# Patient Record
Sex: MALE | Race: WHITE | NOT HISPANIC OR LATINO | Employment: UNEMPLOYED | URBAN - METROPOLITAN AREA
[De-identification: names, ages, dates, MRNs, and addresses within clinical notes are randomized per-mention and may not be internally consistent; named-entity substitution may affect disease eponyms.]

---

## 2022-12-19 ENCOUNTER — HOSPITAL ENCOUNTER (EMERGENCY)
Facility: HOSPITAL | Age: 32
Discharge: HOME | End: 2022-12-20
Attending: EMERGENCY MEDICINE

## 2022-12-19 ENCOUNTER — APPOINTMENT (EMERGENCY)
Dept: RADIOLOGY | Facility: HOSPITAL | Age: 32
End: 2022-12-19

## 2022-12-19 VITALS
WEIGHT: 132.4 LBS | TEMPERATURE: 97.4 F | HEIGHT: 66 IN | HEART RATE: 88 BPM | OXYGEN SATURATION: 100 % | DIASTOLIC BLOOD PRESSURE: 80 MMHG | BODY MASS INDEX: 21.28 KG/M2 | SYSTOLIC BLOOD PRESSURE: 130 MMHG | RESPIRATION RATE: 20 BRPM

## 2022-12-19 DIAGNOSIS — R06.00 DYSPNEA: Primary | ICD-10-CM

## 2022-12-19 LAB
FLUAV RNA RESP QL NAA+PROBE: NEGATIVE
FLUBV RNA RESP QL NAA+PROBE: NEGATIVE
RSV RNA RESP QL NAA+PROBE: NEGATIVE
SARS-COV-2 RNA RESP QL NAA+PROBE: NEGATIVE

## 2022-12-20 LAB
ATRIAL RATE: 71 BPM
ATRIAL RATE: 76 BPM
P AXIS: 53 DEGREES
P AXIS: 73 DEGREES
PR INTERVAL: 186 MS
PR INTERVAL: 190 MS
QRS AXIS: 100 DEGREES
QRS AXIS: 100 DEGREES
QRSD INTERVAL: 94 MS
QRSD INTERVAL: 98 MS
QT INTERVAL: 384 MS
QT INTERVAL: 384 MS
QTC INTERVAL: 417 MS
QTC INTERVAL: 432 MS
T WAVE AXIS: 60 DEGREES
T WAVE AXIS: 61 DEGREES
VENTRICULAR RATE: 71 BPM
VENTRICULAR RATE: 76 BPM

## 2022-12-20 RX ORDER — ONDANSETRON 4 MG/1
4 TABLET, ORALLY DISINTEGRATING ORAL ONCE
Status: COMPLETED | OUTPATIENT
Start: 2022-12-20 | End: 2022-12-20

## 2022-12-20 RX ADMIN — ONDANSETRON 4 MG: 4 TABLET, ORALLY DISINTEGRATING ORAL at 00:26

## 2022-12-20 NOTE — DISCHARGE INSTRUCTIONS
Please fill your prescription for Zyprexa  Please return to the emergency department he develop any new or concerning symptoms including worsening chest pain, worsening shortness of breath, or high fevers

## 2022-12-20 NOTE — ED PROVIDER NOTES
History  Chief Complaint   Patient presents with   • Shortness of Breath     Pt reports feeling SOB for the past 4 days  Pt also reports being homeless and not being able to take all of his psych meds since leaving rehab  Patient is a 26-year-old male who presents with shortness of breath  Patient says that he has had symptoms for the past 3 weeks and includes central chest heaviness that radiates to his right side with associated shortness of breath and palpitations  He says that his symptoms are exacerbated by feelings of anxiety, depression, and exertion  He had 1 episode of nonbilious, nonbloody vomiting prior to presenting to the emergency department which he attributes to his anxiety  He says he had a low-grade fever while he was in rehab recently but that he was afebrile today  He also endorses a sore throat, but denies any headaches, abdominal pain, or leg swelling  Patient is also concerned because he has been off his psych meds  He was recently in group home and then released to [place] [id number] where he was on the inpatient psych unit  He says there they were continuing Zyprexa but he discontinued his Zoloft, Ativan, and Klonopin  He was then sent to rehab where he was also not represcribed his Ativan or Klonopin or Zoloft  He was discharged from rehab few days ago with a prescription for Zyprexa which he has not picked up yet  Patient is homeless and is concerned about where he is going to go when he leaves the emergency department  He would like to speak to someone about housing options  He denies any SI, HI, delusions, or hallucinations  None       Past Medical History:   Diagnosis Date   • Psychiatric disorder        History reviewed  No pertinent surgical history  History reviewed  No pertinent family history  I have reviewed and agree with the history as documented      E-Cigarette/Vaping     E-Cigarette/Vaping Substances     Social History     Tobacco Use   • Smoking status: Former     Types: Cigarettes   • Smokeless tobacco: Never   Substance Use Topics   • Alcohol use: Not Currently   • Drug use: Not Currently     Types: Marijuana, Cocaine        Review of Systems   Constitutional: Negative for chills and fever  HENT: Positive for sore throat  Negative for congestion  Respiratory: Positive for shortness of breath  Negative for cough  Cardiovascular: Positive for chest pain and palpitations  Negative for leg swelling  Gastrointestinal: Positive for vomiting  Negative for abdominal pain, constipation, diarrhea and nausea  Genitourinary: Negative for dysuria and flank pain  Musculoskeletal: Negative for back pain and neck pain  Skin: Negative for pallor and rash  Neurological: Negative for dizziness and light-headedness  Psychiatric/Behavioral: Negative for behavioral problems, hallucinations and suicidal ideas  All other systems reviewed and are negative  Physical Exam  ED Triage Vitals [12/19/22 1856]   Temperature Pulse Respirations Blood Pressure SpO2   (!) 97 4 °F (36 3 °C) 88 20 130/80 100 %      Temp Source Heart Rate Source Patient Position - Orthostatic VS BP Location FiO2 (%)   Oral Monitor Lying Left arm --      Pain Score       --             Orthostatic Vital Signs  Vitals:    12/19/22 1856   BP: 130/80   Pulse: 88   Patient Position - Orthostatic VS: Lying       Physical Exam  Vitals and nursing note reviewed  Constitutional:       General: He is not in acute distress  Appearance: He is well-developed and normal weight  He is not ill-appearing, toxic-appearing or diaphoretic  HENT:      Head: Normocephalic and atraumatic  Mouth/Throat:      Mouth: Mucous membranes are moist       Pharynx: Oropharynx is clear  No pharyngeal swelling or oropharyngeal exudate  Eyes:      Extraocular Movements: Extraocular movements intact  Pupils: Pupils are equal, round, and reactive to light     Cardiovascular:      Rate and Rhythm: Normal rate and regular rhythm  No extrasystoles are present  Pulses: No decreased pulses  Heart sounds: No murmur heard  No friction rub  No gallop  Pulmonary:      Effort: Pulmonary effort is normal  No tachypnea or accessory muscle usage  Breath sounds: Normal breath sounds  No decreased breath sounds, wheezing, rhonchi or rales  Chest:      Chest wall: Tenderness present  Abdominal:      General: Bowel sounds are normal       Palpations: Abdomen is soft  Musculoskeletal:      Cervical back: Normal range of motion and neck supple  Right lower leg: No tenderness  No edema  Left lower leg: No tenderness  No edema  Lymphadenopathy:      Cervical: No cervical adenopathy  Skin:     General: Skin is warm and dry  Coloration: Skin is not pale  Findings: No erythema  Neurological:      Mental Status: He is alert  Psychiatric:         Mood and Affect: Mood normal  Mood is not anxious  Behavior: Behavior normal  Behavior is not agitated  ED Medications  Medications   ondansetron (ZOFRAN-ODT) dispersible tablet 4 mg (4 mg Oral Given 12/20/22 0026)       Diagnostic Studies  Results Reviewed     Procedure Component Value Units Date/Time    FLU/RSV/COVID - if FLU/RSV clinically relevant [[id number]]  (Normal) Collected: 12/19/22 2136    Lab Status: Final result Specimen: Nares from Nose Updated: 12/19/22 2318     SARS-CoV-2 Negative     INFLUENZA A PCR Negative     INFLUENZA B PCR Negative     RSV PCR Negative    Narrative:      FOR PEDIATRIC PATIENTS - copy/paste COVID Guidelines URL to browser: [email or web address]    SARS-CoV-2 assay is a Nucleic Acid Amplification assay intended for the  qualitative detection of nucleic acid from SARS-CoV-2 in nasopharyngeal  swabs  Results are for the presumptive identification of SARS-CoV-2 RNA      Positive results are indicative of infection with SARS-CoV-2, the virus  causing COVID-19, but do not rule out bacterial infection or co-infection  with other viruses  Laboratories within the [place] and its  territories are required to report all positive results to the appropriate  public health authorities  Negative results do not preclude SARS-CoV-2  infection and should not be used as the sole basis for treatment or other  patient management decisions  Negative results must be combined with  clinical observations, patient history, and epidemiological information  This test has not been FDA cleared or approved  This test has been authorized by FDA under an Emergency Use Authorization  (EUA)  This test is only authorized for the duration of time the  declaration that circumstances exist justifying the authorization of the  emergency use of an in vitro diagnostic tests for detection of SARS-CoV-2  virus and/or diagnosis of COVID-19 infection under section 564(b)(1) of  the Act, 21 U  S C  608AMV-2(L)(4), unless the authorization is terminated  or revoked sooner  The test has been validated but independent review by FDA  and CLIA is pending  Test performed using [employer] GeneXpert: This RT-PCR assay targets N2,  a region unique to SARS-CoV-2  A conserved region in the E-gene was chosen  for pan-Sarbecovirus detection which includes SARS-CoV-2  According to CMS-2020-01-R, this platform meets the definition of high-throughput technology                   XR chest 2 views   ED Interpretation by Nadeem Guaman MD (12/19 5631)   No acute cardiopulmonary abnormalities            Procedures  ECG 12 Lead Documentation Only    Date/Time: 12/19/2022 11:36 PM  Performed by: Nadeem Guaman MD  Authorized by: Nadeem Guaman MD     Indications / Diagnosis:  Chest pain  ECG reviewed by me, the ED Provider: yes    Patient location:  ED  Previous ECG:     Previous ECG:  Unavailable  Interpretation:     Interpretation: normal    Rate:     ECG rate:  71    ECG rate assessment: normal Rhythm:     Rhythm: sinus rhythm    Ectopy:     Ectopy: none    QRS:     QRS axis:  Normal    QRS intervals:  Normal  Conduction:     Conduction: normal    ST segments:     ST segments:  Normal  T waves:     T waves: normal    Comments:      J point elevation in V2 and V3, no reciprocal depressions          ED Course  ED Course as of 12/20/22 0717   Tue Dec 20, 2022   0005 Told by crisis worker that after getting his resources he began vomiting  Patient says he feels like he is going through alcohol withdrawal however patient has been in rehab since 2 days ago and has only had two beers since leaving rehab  Zofran and po challenge  MDM  Number of Diagnoses or Management Options  Dyspnea: new and does not require workup  Diagnosis management comments: Patient is a 36-year-old male with history of substance use disorder who presents with shortness of breath  Differential diagnosis includes but is not limited to ACS, new thorax, pneumonia  Will get EKG, chest x-ray, viral swab  Discussed with patient that he will need to  his Zyprexa from the pharmacy but that we will not be able to prescribe his Zoloft nor any other trolled substances for him  EKG, chest x-ray, viral swab all unremarkable  On reevaluation, patient had an episode of vomiting  He stated that he thought he was in alcohol withdrawal, but patient did not drink during rehab and for the 2 days since has only had a couple of beers  No clinical suspicion for alcohol withdrawal at this time  He was given Zofran and observed  His symptoms improved and was discharged         Amount and/or Complexity of Data Reviewed  Clinical lab tests: reviewed and ordered  Tests in the radiology section of CPT®: ordered and reviewed  Review and summarize past medical records: yes  Discuss the patient with other providers: yes  Independent visualization of images, tracings, or specimens: yes    Patient Progress  Patient progress: stable      Disposition  Final diagnoses:   Dyspnea     Time reflects when diagnosis was documented in both MDM as applicable and the Disposition within this note     Time User Action Codes Description Comment    12/20/2022  2:09 AM Jenna Bailey Add [R06 00] Dyspnea       ED Disposition     ED Disposition   Discharge    Condition   Good    Date/Time   Tue Dec 20, 2022  2:09 AM    Comment   Bill Ramirez discharge to home/self care  Follow-up Information     Follow up With Specialties Details Why Contact Info Additional [address] Emergency Department Emergency Medicine Go to  If symptoms worsen or if you have any other specific concerns [address] [address] [phone]  [address] Emergency Department, [address] Family Medicine Call today To make appointment for reevaluation if you do not have a PCP [address] [place] [phone]  [facility], [address]          There are no discharge medications for this patient  No discharge procedures on file  PDMP Review     None           ED Provider  Attending physically available and evaluated Bill Ramirez  I managed the patient along with the ED Attending      Electronically Signed by         Lexy Rendon MD  12/20/22 5646

## 2022-12-20 NOTE — ED ATTENDING ATTESTATION
Sylvia Mcwilliams MD, saw and evaluated the patient  I have discussed the patient with the resident and agree with the resident's findings, Plan of Care, and MDM as documented in the resident's note, except where noted  All available labs and Radiology studies were reviewed  I was present for key portions of any procedure(s) performed by the resident and I was immediately available to provide assistance  At this point I agree with the current assessment done in the Emergency Department  I have conducted an independent evaluation of this patient a history and physical is as follows:    34 yo homeless male with a history of depression and anxiety presents to the ED complaining of intermittent chest pain and shortness of breath x 3 weeks  The patient reports a vague anterior chest tightness, nonradiating and nonexertional  He says he was discharged from a local rehab facility without any of his medications a few days ago and now feels "really anxious"  (+) Nausea and one episode of NBNB vomiting prior to arrival  He is also having difficulty getting into a shelter and is requesting to speak with both Crisis and Social Work  No LE swelling or pain  He denies cough and hemoptysis  No fevers/chills  No SI, HI, or hallucinations  ROS: per resident physician note    Gen: anxious appearing, AA&Ox3  HEENT: PERRL, EOMI  Neck: supple  CV: RRR  Lungs: CTA B/L  Abdomen: soft, NT/ND  Ext: no swelling or deformity  Neuro: 5/5 strength all extremities, sensation grossly intact  Skin: no rash  Psych: no SI, HI, or hallucinations    ED Course  The patient is obviously anxious but otherwise well appearing with stable vital signs and a benign exam  Chest pain and SOB are reportedly consistent with his usual panic attacks  Very low clinical suspicion for ACS, TAD, and PE  He is PERC negative  Medical records reviewed --> the patient was prescribed his Zyprexa on 11/28 but did not  the script   He says he will pick the prescription tomorrow  CXR and EKG unremarkable  Case discussed with Crisis --> they will see the patient in the ED        Critical Care Time  Procedures